# Patient Record
Sex: FEMALE | Race: WHITE | URBAN - METROPOLITAN AREA
[De-identification: names, ages, dates, MRNs, and addresses within clinical notes are randomized per-mention and may not be internally consistent; named-entity substitution may affect disease eponyms.]

---

## 2018-07-12 ENCOUNTER — OFFICE VISIT (OUTPATIENT)
Dept: SURGERY | Age: 43
End: 2018-07-12

## 2018-07-12 VITALS
SYSTOLIC BLOOD PRESSURE: 110 MMHG | HEIGHT: 69 IN | WEIGHT: 125 LBS | DIASTOLIC BLOOD PRESSURE: 60 MMHG | BODY MASS INDEX: 18.51 KG/M2

## 2018-07-12 DIAGNOSIS — K64.5 THROMBOSED HEMORRHOIDS: Primary | ICD-10-CM

## 2018-07-12 PROCEDURE — 99203 OFFICE O/P NEW LOW 30 MIN: CPT | Performed by: SURGERY

## 2018-07-12 RX ORDER — POLYETHYLENE GLYCOL 3350 17 G/17G
17 POWDER, FOR SOLUTION ORAL DAILY
Qty: 510 G | Refills: 0 | Status: SHIPPED | OUTPATIENT
Start: 2018-07-12 | End: 2018-08-11

## 2018-07-12 RX ORDER — HYDROCORTISONE ACETATE 25 MG/1
25 SUPPOSITORY RECTAL EVERY 12 HOURS
Qty: 12 SUPPOSITORY | Refills: 1 | Status: SHIPPED | OUTPATIENT
Start: 2018-07-12

## 2018-07-12 ASSESSMENT — ENCOUNTER SYMPTOMS
ANAL BLEEDING: 1
EYE ITCHING: 0
CONSTIPATION: 0
CHOKING: 0
PHOTOPHOBIA: 0
STRIDOR: 0
SORE THROAT: 0
RECTAL PAIN: 1
BACK PAIN: 0
APNEA: 0
EYE REDNESS: 0
COLOR CHANGE: 0

## 2018-07-12 NOTE — PROGRESS NOTES
Chief Complaint   Patient presents with    New Patient     N/P Hemorrhoids ref Dr Summer Haas:  HPI: Patient presents for evaluation of possible hemorrhoids. Onset of symptoms was 2 days ago. Patient does report perianal mass. Patient describes symptoms as gradually worsening. Patient denies fever/chills. Patient denies drainage from the rectal area. Treatment to date has been none. Patient denies a personal history of colon cancer. Patient denies history of previous STDs  Patient denies a personal history of IBD. I have reviewed the patient's(pertinent information to this visit) medical history, family history(scanned in  the Media tab under \"patient questioner\"), social history and review of systems with the patient today in the office. No past surgical history on file. No past medical history on file. No family history on file. Social History     Social History    Marital status:      Spouse name: N/A    Number of children: N/A    Years of education: N/A     Occupational History    Not on file. Social History Main Topics    Smoking status: Current Every Day Smoker     Packs/day: 1.00     Types: Cigarettes    Smokeless tobacco: Never Used    Alcohol use Yes      Comment: ONCE OR TWICE A WEEK    Drug use: Unknown    Sexual activity: Not on file     Other Topics Concern    Not on file     Social History Narrative    No narrative on file       Current Outpatient Prescriptions   Medication Sig Dispense Refill    polyethylene glycol (MIRALAX) powder Take 17 g by mouth daily 510 g 0    hydrocortisone (ANUSOL-HC) 25 MG suppository Place 1 suppository rectally every 12 hours 12 suppository 1     No current facility-administered medications for this visit. Allergies   Allergen Reactions    Morphine        Review of Systems:         Review of Systems   Constitutional: Negative for chills and fever.    HENT: Negative for ear pain, mouth sores, sore throat and Refill:  0    hydrocortisone (ANUSOL-HC) 25 MG suppository     Sig: Place 1 suppository rectally every 12 hours     Dispense:  12 suppository     Refill:  1        Follow Up:  Return in about 2 weeks (around 7/26/2018).       Silvia Josue MD

## 2018-07-26 ENCOUNTER — TELEPHONE (OUTPATIENT)
Dept: SURGERY | Age: 43
End: 2018-07-26

## 2022-07-09 ENCOUNTER — TELEPHONE ENCOUNTER (OUTPATIENT)
Dept: URBAN - METROPOLITAN AREA CLINIC 121 | Facility: CLINIC | Age: 47
End: 2022-07-09

## 2022-07-10 ENCOUNTER — TELEPHONE ENCOUNTER (OUTPATIENT)
Dept: URBAN - METROPOLITAN AREA CLINIC 121 | Facility: CLINIC | Age: 47
End: 2022-07-10

## 2022-09-26 ENCOUNTER — LAB OUTSIDE AN ENCOUNTER (OUTPATIENT)
Dept: URBAN - METROPOLITAN AREA CLINIC 60 | Facility: CLINIC | Age: 47
End: 2022-09-26

## 2022-09-26 ENCOUNTER — WEB ENCOUNTER (OUTPATIENT)
Dept: URBAN - METROPOLITAN AREA CLINIC 60 | Facility: CLINIC | Age: 47
End: 2022-09-26

## 2022-09-26 ENCOUNTER — OFFICE VISIT (OUTPATIENT)
Dept: URBAN - METROPOLITAN AREA CLINIC 60 | Facility: CLINIC | Age: 47
End: 2022-09-26
Payer: COMMERCIAL

## 2022-09-26 ENCOUNTER — OFFICE VISIT (OUTPATIENT)
Dept: URBAN - METROPOLITAN AREA CLINIC 60 | Facility: CLINIC | Age: 47
End: 2022-09-26

## 2022-09-26 VITALS
HEIGHT: 70 IN | OXYGEN SATURATION: 97 % | SYSTOLIC BLOOD PRESSURE: 112 MMHG | DIASTOLIC BLOOD PRESSURE: 68 MMHG | HEART RATE: 72 BPM | RESPIRATION RATE: 12 BRPM | BODY MASS INDEX: 19.81 KG/M2 | WEIGHT: 138.4 LBS | TEMPERATURE: 97.9 F

## 2022-09-26 DIAGNOSIS — Z12.11 SCREENING FOR COLON CANCER: ICD-10-CM

## 2022-09-26 DIAGNOSIS — R93.89 ABNORMAL FINDING ON IMAGING: ICD-10-CM

## 2022-09-26 PROCEDURE — 99203 OFFICE O/P NEW LOW 30 MIN: CPT | Performed by: INTERNAL MEDICINE

## 2022-09-26 RX ORDER — DICYCLOMINE HYDROCHLORIDE 20 MG/1
TABLET ORAL
Qty: 40 TABLET | Status: ACTIVE | COMMUNITY

## 2022-09-26 RX ORDER — POLYETHYLENE GLYCOL 3350, SODIUM CHLORIDE, SODIUM BICARBONATE, POTASSIUM CHLORIDE 420; 11.2; 5.72; 1.48 G/4L; G/4L; G/4L; G/4L
AS DIRECTED POWDER, FOR SOLUTION ORAL ONCE
Qty: 420 G | Refills: 0 | OUTPATIENT
Start: 2022-09-26 | End: 2022-09-27

## 2022-09-26 RX ORDER — MECLIZINE HYDROCHLORIDE 25 MG/1
TABLET ORAL
Qty: 90 TABLET | Status: ACTIVE | COMMUNITY

## 2022-09-26 RX ORDER — OXYCODONE AND ACETAMINOPHEN 5; 325 MG/1; MG/1
TABLET ORAL
Qty: 8 TABLET | Status: ACTIVE | COMMUNITY

## 2022-09-26 RX ORDER — METHYLPREDNISOLONE 32 MG/1
TABLET ORAL
Qty: 2 TABLET | Status: ACTIVE | COMMUNITY

## 2022-09-26 RX ORDER — VENLAFAXINE HYDROCHLORIDE 37.5 MG/1
CAPSULE, EXTENDED RELEASE ORAL
Qty: 7 CAPSULE | Status: ACTIVE | COMMUNITY

## 2022-09-26 RX ORDER — ALPRAZOLAM 0.5 MG/1
TABLET ORAL
Qty: 21 TABLET | Status: ACTIVE | COMMUNITY

## 2022-09-26 RX ORDER — FLUTICASONE FUROATE, UMECLIDINIUM BROMIDE AND VILANTEROL TRIFENATATE 100; 62.5; 25 UG/1; UG/1; UG/1
POWDER RESPIRATORY (INHALATION)
Qty: 180 UNSPECIFIED | Status: ACTIVE | COMMUNITY

## 2022-09-26 RX ORDER — ONDANSETRON 4 MG/1
TABLET, ORALLY DISINTEGRATING ORAL
Qty: 30 TABLET | Status: ACTIVE | COMMUNITY

## 2022-09-26 RX ORDER — LAMOTRIGINE 25 MG/1
TABLET ORAL
Qty: 210 TABLET | Status: ACTIVE | COMMUNITY

## 2022-09-26 RX ORDER — CARIPRAZINE 1.5 MG/1
CAPSULE, GELATIN COATED ORAL
Qty: 30 CAPSULE | Status: ACTIVE | COMMUNITY

## 2022-09-26 NOTE — PHYSICAL EXAM CONSTITUTIONAL:
Continue ointment tid. well developed, well nourished , in no acute distress , ambulating without difficulty , normal communication ability

## 2022-09-26 NOTE — HPI-TODAY'S VISIT:
Maureen is a 45 y/o female that presents today as a new patient. Referred for screening colonoscopy and abnormal PET scan. No prior colonoscopy. No reported family history of colon cancer. She has had abnormal imaging in the past which has revealed lymph nodes in the mediastinum. She has had thoracic surgery for biopsy and removal of these lymph nodes in the past. Exact etiology is unclear. Possible sarcoidosis based on her pathology and blood work. She had CT abdomen/pelvis 07/2022. CT abdomen/pelvis was unremarkable. She had PET scan which showed some diffuse uptake in the gastric wall, thought to be inflammatory. No prior EGD.  She has occasional nausea. Denies vomiting. She has alternating bouts of diarrhea and constipation. Denies abdominal pain. Denies blood in the stool, melena, hematochezia. Denies weight loss. Denies frequent heartburn. Denies dysphagia.

## 2022-10-18 ENCOUNTER — CLAIMS CREATED FROM THE CLAIM WINDOW (OUTPATIENT)
Dept: URBAN - METROPOLITAN AREA CLINIC 4 | Facility: CLINIC | Age: 47
End: 2022-10-18
Payer: COMMERCIAL

## 2022-10-18 ENCOUNTER — OFFICE VISIT (OUTPATIENT)
Dept: URBAN - METROPOLITAN AREA SURGERY CENTER 4 | Facility: SURGERY CENTER | Age: 47
End: 2022-10-18
Payer: COMMERCIAL

## 2022-10-18 DIAGNOSIS — K64.0 GRADE I HEMORRHOIDS: ICD-10-CM

## 2022-10-18 DIAGNOSIS — R93.3: ICD-10-CM

## 2022-10-18 DIAGNOSIS — Z12.11 SCREENING FOR COLON CANCER: ICD-10-CM

## 2022-10-18 DIAGNOSIS — K22.89 OTHER SPECIFIED DISEASE OF ESOPHAGUS: ICD-10-CM

## 2022-10-18 DIAGNOSIS — K31.89 OTHER DISEASES OF STOMACH AND DUODENUM: ICD-10-CM

## 2022-10-18 PROCEDURE — G0121 COLON CA SCRN NOT HI RSK IND: HCPCS | Performed by: INTERNAL MEDICINE

## 2022-10-18 PROCEDURE — 43239 EGD BIOPSY SINGLE/MULTIPLE: CPT | Performed by: INTERNAL MEDICINE

## 2022-10-18 PROCEDURE — G8918 PT W/O PREOP ORDER IV AB PRO: HCPCS | Performed by: INTERNAL MEDICINE

## 2022-10-18 PROCEDURE — 88312 SPECIAL STAINS GROUP 1: CPT | Performed by: PATHOLOGY

## 2022-10-18 PROCEDURE — G8907 PT DOC NO EVENTS ON DISCHARG: HCPCS | Performed by: INTERNAL MEDICINE

## 2022-10-18 PROCEDURE — 88305 TISSUE EXAM BY PATHOLOGIST: CPT | Performed by: PATHOLOGY

## 2022-10-18 RX ORDER — ALPRAZOLAM 0.5 MG/1
TABLET ORAL
Qty: 21 TABLET | Status: ACTIVE | COMMUNITY

## 2022-10-18 RX ORDER — CARIPRAZINE 1.5 MG/1
CAPSULE, GELATIN COATED ORAL
Qty: 30 CAPSULE | Status: ACTIVE | COMMUNITY

## 2022-10-18 RX ORDER — LAMOTRIGINE 25 MG/1
TABLET ORAL
Qty: 210 TABLET | Status: ACTIVE | COMMUNITY

## 2022-10-18 RX ORDER — ONDANSETRON 4 MG/1
TABLET, ORALLY DISINTEGRATING ORAL
Qty: 30 TABLET | Status: ACTIVE | COMMUNITY

## 2022-10-18 RX ORDER — VENLAFAXINE HYDROCHLORIDE 37.5 MG/1
CAPSULE, EXTENDED RELEASE ORAL
Qty: 7 CAPSULE | Status: ACTIVE | COMMUNITY

## 2022-10-18 RX ORDER — MECLIZINE HYDROCHLORIDE 25 MG/1
TABLET ORAL
Qty: 90 TABLET | Status: ACTIVE | COMMUNITY

## 2022-10-18 RX ORDER — OXYCODONE AND ACETAMINOPHEN 5; 325 MG/1; MG/1
TABLET ORAL
Qty: 8 TABLET | Status: ACTIVE | COMMUNITY

## 2022-10-18 RX ORDER — FLUTICASONE FUROATE, UMECLIDINIUM BROMIDE AND VILANTEROL TRIFENATATE 100; 62.5; 25 UG/1; UG/1; UG/1
POWDER RESPIRATORY (INHALATION)
Qty: 180 UNSPECIFIED | Status: ACTIVE | COMMUNITY

## 2022-10-18 RX ORDER — DICYCLOMINE HYDROCHLORIDE 20 MG/1
TABLET ORAL
Qty: 40 TABLET | Status: ACTIVE | COMMUNITY

## 2022-10-18 RX ORDER — METHYLPREDNISOLONE 32 MG/1
TABLET ORAL
Qty: 2 TABLET | Status: ACTIVE | COMMUNITY

## 2022-11-02 ENCOUNTER — OFFICE VISIT (OUTPATIENT)
Dept: URBAN - METROPOLITAN AREA CLINIC 60 | Facility: CLINIC | Age: 47
End: 2022-11-02

## 2022-11-08 ENCOUNTER — OFFICE VISIT (OUTPATIENT)
Dept: URBAN - METROPOLITAN AREA CLINIC 63 | Facility: CLINIC | Age: 47
End: 2022-11-08

## 2022-11-29 ENCOUNTER — OFFICE VISIT (OUTPATIENT)
Dept: URBAN - METROPOLITAN AREA CLINIC 63 | Facility: CLINIC | Age: 47
End: 2022-11-29
Payer: COMMERCIAL

## 2022-11-29 VITALS
TEMPERATURE: 98.4 F | HEIGHT: 70 IN | HEART RATE: 90 BPM | DIASTOLIC BLOOD PRESSURE: 80 MMHG | WEIGHT: 142 LBS | OXYGEN SATURATION: 98 % | BODY MASS INDEX: 20.33 KG/M2 | SYSTOLIC BLOOD PRESSURE: 118 MMHG

## 2022-11-29 DIAGNOSIS — R93.89 ABNORMAL FINDING ON IMAGING: ICD-10-CM

## 2022-11-29 DIAGNOSIS — Z12.11 SCREENING FOR MALIGNANT NEOPLASM OF COLON: ICD-10-CM

## 2022-11-29 PROBLEM — 408574004: Status: ACTIVE | Noted: 2022-09-26

## 2022-11-29 PROCEDURE — 99213 OFFICE O/P EST LOW 20 MIN: CPT | Performed by: PHYSICIAN ASSISTANT

## 2022-11-29 RX ORDER — FLUTICASONE FUROATE, UMECLIDINIUM BROMIDE AND VILANTEROL TRIFENATATE 100; 62.5; 25 UG/1; UG/1; UG/1
POWDER RESPIRATORY (INHALATION)
Qty: 180 UNSPECIFIED | Status: ACTIVE | COMMUNITY

## 2022-11-29 RX ORDER — ONDANSETRON 4 MG/1
TABLET, ORALLY DISINTEGRATING ORAL
Qty: 30 TABLET | Status: ACTIVE | COMMUNITY

## 2022-11-29 RX ORDER — VENLAFAXINE HYDROCHLORIDE 37.5 MG/1
CAPSULE, EXTENDED RELEASE ORAL
Qty: 7 CAPSULE | Status: ACTIVE | COMMUNITY

## 2022-11-29 NOTE — HPI-TODAY'S VISIT:
46-year-old female was referred to the office in September 2022 for baseline screening colonoscopy and abnormal PET scan.  She has had abnormal imaging in the past which has revealed lymph nodes in the mediastinum.  She had thoracic surgery for biopsy and removal of these lymph nodes in the past.  The etiology is unclear.  Possible sarcoidosis based on her pathology and blood work.  CT abdomen/pelvis done in July 2022 was unremarkable.  She had a PET scan which showed some diffuse uptake in the gastric wall, thought to be inflammatory. She reported occasional nausea at her initial office visit.  She denied any vomiting.  She denied abdominal pain, blood in the stool, melena, hematochezia.  She reported alternating bouts of diarrhea and constipation.  She had no other complaints. She follows up today after EGD and colonoscopy which were done on October 18, 2022.  Her EGD demonstrated an irregular Z-line at the GE junction.  The biopsy showed no significant abnormality.  Mildly erythematous mucosa was found in the gastric antrum.  There were no endoscopic findings to correlate with the abnormal PET scan findings.  Biopsies were taken from the gastric body and antrum and demonstrated no significant abnormality.  The duodenum appeared normal.  Her colonoscopy to the cecum was normal with the exception of grade 1 internal hemorrhoids.  She follows up today doing well. States she has some occasional nausea for which she utilizes zofran which works well. She has no other GI complaints today.   She has no family history of colon cancer.

## 2023-09-08 ENCOUNTER — OFFICE VISIT (OUTPATIENT)
Dept: URBAN - METROPOLITAN AREA CLINIC 60 | Facility: CLINIC | Age: 48
End: 2023-09-08
Payer: COMMERCIAL

## 2023-09-08 ENCOUNTER — LAB OUTSIDE AN ENCOUNTER (OUTPATIENT)
Dept: URBAN - METROPOLITAN AREA CLINIC 60 | Facility: CLINIC | Age: 48
End: 2023-09-08

## 2023-09-08 ENCOUNTER — WEB ENCOUNTER (OUTPATIENT)
Dept: URBAN - METROPOLITAN AREA CLINIC 60 | Facility: CLINIC | Age: 48
End: 2023-09-08

## 2023-09-08 VITALS
HEIGHT: 70 IN | SYSTOLIC BLOOD PRESSURE: 124 MMHG | DIASTOLIC BLOOD PRESSURE: 68 MMHG | TEMPERATURE: 98 F | WEIGHT: 126.8 LBS | BODY MASS INDEX: 18.15 KG/M2 | OXYGEN SATURATION: 98 % | HEART RATE: 68 BPM | RESPIRATION RATE: 12 BRPM

## 2023-09-08 DIAGNOSIS — K58.2 IRRITABLE BOWEL SYNDROME WITH ALTERNATING BOWEL HABITS: ICD-10-CM

## 2023-09-08 DIAGNOSIS — R13.19 ESOPHAGEAL DYSPHAGIA: ICD-10-CM

## 2023-09-08 DIAGNOSIS — Z12.11 SCREENING FOR MALIGNANT NEOPLASM OF COLON: ICD-10-CM

## 2023-09-08 DIAGNOSIS — R10.13 EPIGASTRIC PAIN: ICD-10-CM

## 2023-09-08 PROBLEM — 440544005: Status: ACTIVE | Noted: 2023-09-08

## 2023-09-08 PROCEDURE — 99214 OFFICE O/P EST MOD 30 MIN: CPT | Performed by: PHYSICIAN ASSISTANT

## 2023-09-08 RX ORDER — PENTOXIFYLLINE 400 MG/1
1 TABLET WITH MEALS TABLET, EXTENDED RELEASE ORAL TWICE A DAY
Status: ACTIVE | COMMUNITY

## 2023-09-08 RX ORDER — ACETAZOLAMIDE 250 MG/1
1 TABLET AS NEEDED TABLET ORAL ONCE A DAY
Status: ACTIVE | COMMUNITY

## 2023-09-08 RX ORDER — METOPROLOL TARTRATE 25 MG/1
1 TABLET WITH FOOD TABLET, FILM COATED ORAL TWICE A DAY
Status: ACTIVE | COMMUNITY

## 2023-09-08 RX ORDER — BUDESONIDE 1 MG/2ML
1 ML SUSPENSION RESPIRATORY (INHALATION) TWICE A DAY
Status: ACTIVE | COMMUNITY

## 2023-09-08 RX ORDER — FLUTICASONE FUROATE, UMECLIDINIUM BROMIDE AND VILANTEROL TRIFENATATE 100; 62.5; 25 UG/1; UG/1; UG/1
POWDER RESPIRATORY (INHALATION)
Qty: 180 UNSPECIFIED | Status: ACTIVE | COMMUNITY

## 2023-09-08 RX ORDER — BUDESONIDE 3 MG/1
2 CAPSULES CAPSULE ORAL ONCE A DAY
Status: ACTIVE | COMMUNITY

## 2023-09-08 RX ORDER — ONDANSETRON 4 MG/1
TABLET, ORALLY DISINTEGRATING ORAL
Qty: 30 TABLET | Status: ACTIVE | COMMUNITY

## 2023-09-08 RX ORDER — KETOTIFEN FUMARATE 0.25 MG/ML
1 DROP INTO AFFECTED EYE SOLUTION/ DROPS OPHTHALMIC TWICE A DAY
Status: ACTIVE | COMMUNITY

## 2023-09-08 NOTE — HPI-TODAY'S VISIT:
47-year-old female with POTS and asthma presents to the office for evaluation of dysphagia.  She states she has been having difficulty swallowing her pills and also solid foods. States she chokes frequently and will belch up her pills. She choked on a fish bone as a child and has had dysphagia since then. She reports alternating constipation with diarrhea. States she was having pain in the epigastrium on a daily basis which is severe. She states her abdominal pain feels like stabbing pain. She has not had any significant abdominal pain over the past week. No abdominal pain today.  Bowel habits chronically alternate between diarrhea and constipation. No blood in the stool. Denies regular NSAID use. Previously used aleve for headaches but no longer using this.   She was last seen in the office in 2022.  She was referred for baseline screening colonoscopy and to evaluate PET scan showing diffuse uptake in the gastric wall. EGD and colonoscopy were done on October 18, 2022.  Her EGD demonstrated an irregular Z-line at the GE junction.  The biopsy showed no significant abnormality.  Mildly erythematous mucosa was found in the gastric antrum.  There were no endoscopic findings to correlate with the abnormal PET scan findings.  Biopsies from the gastric body and antrum demonstrated no significant abnormality.  The duodenum appeared normal.  Her colonoscopy to the cecum was normal with the exception of grade 1 internal hemorrhoids.  Repeat colonoscopy was recommended in 10 years. She has no family history of colon cancer.

## 2023-09-09 LAB
A/G RATIO: 2.5
ALBUMIN: 4.3
ALKALINE PHOSPHATASE: 54
ALT (SGPT): 9
AST (SGOT): 12
BILIRUBIN, TOTAL: 0.3
BUN/CREATININE RATIO: 8
BUN: 6
CALCIUM: 8.8
CARBON DIOXIDE, TOTAL: 21
CHLORIDE: 110
CREATININE: 0.72
EGFR: 104
GLOBULIN, TOTAL: 1.7
GLUCOSE: 81
HEMATOCRIT: 39
HEMOGLOBIN: 13
LIPASE: 54
MCH: 31
MCHC: 33.3
MCV: 93
NRBC: (no result)
PLATELETS: 233
POTASSIUM: 3.5
PROTEIN, TOTAL: 6
RBC: 4.19
RDW: 13.1
SODIUM: 143
WBC: 5.9

## 2023-09-26 ENCOUNTER — OFFICE VISIT (OUTPATIENT)
Dept: URBAN - METROPOLITAN AREA SURGERY CENTER 4 | Facility: SURGERY CENTER | Age: 48
End: 2023-09-26
Payer: COMMERCIAL

## 2023-09-26 ENCOUNTER — CLAIMS CREATED FROM THE CLAIM WINDOW (OUTPATIENT)
Dept: URBAN - METROPOLITAN AREA CLINIC 4 | Facility: CLINIC | Age: 48
End: 2023-09-26
Payer: COMMERCIAL

## 2023-09-26 DIAGNOSIS — R13.10 DYSPHAGIA: ICD-10-CM

## 2023-09-26 DIAGNOSIS — K31.89 OTHER DISEASES OF STOMACH AND DUODENUM: ICD-10-CM

## 2023-09-26 DIAGNOSIS — K22.89 OTHER SPECIFIED DISEASE OF ESOPHAGUS: ICD-10-CM

## 2023-09-26 DIAGNOSIS — K22.9 IRREGULAR Z LINE OF ESOPHAGUS: ICD-10-CM

## 2023-09-26 DIAGNOSIS — K29.70 GASTRITIS: ICD-10-CM

## 2023-09-26 DIAGNOSIS — K29.70 GASTRITIS, UNSPECIFIED, WITHOUT BLEEDING: ICD-10-CM

## 2023-09-26 DIAGNOSIS — T47.8X5A ADVERSE EFFECT OF OTHER AGENTS PRIMARILY AFFECTING GASTROINTESTINAL SYSTEM, INITIAL ENCOUNTER: ICD-10-CM

## 2023-09-26 DIAGNOSIS — Z12.11 COLON CANCER SCREENING (HIGH RISK): ICD-10-CM

## 2023-09-26 PROCEDURE — 88342 IMHCHEM/IMCYTCHM 1ST ANTB: CPT | Performed by: PATHOLOGY

## 2023-09-26 PROCEDURE — 43239 EGD BIOPSY SINGLE/MULTIPLE: CPT | Performed by: INTERNAL MEDICINE

## 2023-09-26 PROCEDURE — 88305 TISSUE EXAM BY PATHOLOGIST: CPT | Performed by: PATHOLOGY

## 2023-09-26 PROCEDURE — 88312 SPECIAL STAINS GROUP 1: CPT | Performed by: PATHOLOGY

## 2023-09-26 PROCEDURE — 43450 DILATE ESOPHAGUS 1/MULT PASS: CPT | Performed by: INTERNAL MEDICINE

## 2023-09-26 PROCEDURE — 88313 SPECIAL STAINS GROUP 2: CPT | Performed by: PATHOLOGY

## 2023-09-26 PROCEDURE — 00731 ANES UPR GI NDSC PX NOS: CPT | Performed by: NURSE ANESTHETIST, CERTIFIED REGISTERED

## 2023-09-26 RX ORDER — BUDESONIDE 3 MG/1
2 CAPSULES CAPSULE ORAL ONCE A DAY
Status: ACTIVE | COMMUNITY

## 2023-09-26 RX ORDER — BUDESONIDE 1 MG/2ML
1 ML SUSPENSION RESPIRATORY (INHALATION) TWICE A DAY
Status: ACTIVE | COMMUNITY

## 2023-09-26 RX ORDER — PENTOXIFYLLINE 400 MG/1
1 TABLET WITH MEALS TABLET, EXTENDED RELEASE ORAL TWICE A DAY
Status: ACTIVE | COMMUNITY

## 2023-09-26 RX ORDER — METOPROLOL TARTRATE 25 MG/1
1 TABLET WITH FOOD TABLET, FILM COATED ORAL TWICE A DAY
Status: ACTIVE | COMMUNITY

## 2023-09-26 RX ORDER — ACETAZOLAMIDE 250 MG/1
1 TABLET AS NEEDED TABLET ORAL ONCE A DAY
Status: ACTIVE | COMMUNITY

## 2023-09-26 RX ORDER — ONDANSETRON 4 MG/1
TABLET, ORALLY DISINTEGRATING ORAL
Qty: 30 TABLET | Status: ACTIVE | COMMUNITY

## 2023-09-26 RX ORDER — KETOTIFEN FUMARATE 0.25 MG/ML
1 DROP INTO AFFECTED EYE SOLUTION/ DROPS OPHTHALMIC TWICE A DAY
Status: ACTIVE | COMMUNITY

## 2023-09-26 RX ORDER — FLUTICASONE FUROATE, UMECLIDINIUM BROMIDE AND VILANTEROL TRIFENATATE 100; 62.5; 25 UG/1; UG/1; UG/1
POWDER RESPIRATORY (INHALATION)
Qty: 180 UNSPECIFIED | Status: ACTIVE | COMMUNITY

## 2023-09-27 ENCOUNTER — TELEPHONE ENCOUNTER (OUTPATIENT)
Dept: URBAN - METROPOLITAN AREA CLINIC 60 | Facility: CLINIC | Age: 48
End: 2023-09-27

## 2023-09-28 ENCOUNTER — TELEPHONE ENCOUNTER (OUTPATIENT)
Dept: URBAN - METROPOLITAN AREA CLINIC 63 | Facility: CLINIC | Age: 48
End: 2023-09-28

## 2023-10-18 ENCOUNTER — OFFICE VISIT (OUTPATIENT)
Dept: URBAN - METROPOLITAN AREA CLINIC 60 | Facility: CLINIC | Age: 48
End: 2023-10-18
Payer: COMMERCIAL

## 2023-10-18 VITALS
OXYGEN SATURATION: 98 % | WEIGHT: 122 LBS | BODY MASS INDEX: 17.47 KG/M2 | HEIGHT: 70 IN | DIASTOLIC BLOOD PRESSURE: 78 MMHG | HEART RATE: 78 BPM | TEMPERATURE: 98.2 F | SYSTOLIC BLOOD PRESSURE: 122 MMHG

## 2023-10-18 DIAGNOSIS — K58.2 IRRITABLE BOWEL SYNDROME WITH ALTERNATING BOWEL HABITS: ICD-10-CM

## 2023-10-18 DIAGNOSIS — R10.13 EPIGASTRIC PAIN: ICD-10-CM

## 2023-10-18 DIAGNOSIS — Z12.11 SCREENING FOR MALIGNANT NEOPLASM OF COLON: ICD-10-CM

## 2023-10-18 PROCEDURE — 99214 OFFICE O/P EST MOD 30 MIN: CPT | Performed by: PHYSICIAN ASSISTANT

## 2023-10-18 RX ORDER — BUDESONIDE 1 MG/2ML
1 ML SUSPENSION RESPIRATORY (INHALATION) TWICE A DAY
Status: ACTIVE | COMMUNITY

## 2023-10-18 RX ORDER — ACETAZOLAMIDE 250 MG/1
1 TABLET AS NEEDED TABLET ORAL ONCE A DAY
Status: ACTIVE | COMMUNITY

## 2023-10-18 RX ORDER — ONDANSETRON 4 MG/1
TABLET, ORALLY DISINTEGRATING ORAL
Qty: 30 TABLET | Status: ACTIVE | COMMUNITY

## 2023-10-18 RX ORDER — KETOTIFEN FUMARATE 0.25 MG/ML
1 DROP INTO AFFECTED EYE SOLUTION/ DROPS OPHTHALMIC TWICE A DAY
Status: ACTIVE | COMMUNITY

## 2023-10-18 RX ORDER — FLUTICASONE FUROATE, UMECLIDINIUM BROMIDE AND VILANTEROL TRIFENATATE 100; 62.5; 25 UG/1; UG/1; UG/1
POWDER RESPIRATORY (INHALATION)
Qty: 180 UNSPECIFIED | Status: ACTIVE | COMMUNITY

## 2023-10-18 RX ORDER — PENTOXIFYLLINE 400 MG/1
1 TABLET WITH MEALS TABLET, EXTENDED RELEASE ORAL TWICE A DAY
Status: ACTIVE | COMMUNITY

## 2023-10-18 RX ORDER — METOPROLOL TARTRATE 25 MG/1
1 TABLET WITH FOOD TABLET, FILM COATED ORAL TWICE A DAY
Status: ACTIVE | COMMUNITY

## 2023-10-18 RX ORDER — PANTOPRAZOLE SODIUM 40 MG/1
1 TABLET TABLET, DELAYED RELEASE ORAL ONCE A DAY
Qty: 30 | Refills: 3 | OUTPATIENT
Start: 2023-10-18

## 2023-10-18 RX ORDER — BUDESONIDE 3 MG/1
2 CAPSULES CAPSULE ORAL ONCE A DAY
Status: ACTIVE | COMMUNITY

## 2023-10-18 NOTE — HPI-TODAY'S VISIT:
47-year-old female with POTS and asthma presented to the office last month for evaluation of dysphagia.  She reported having difficulty swallowing her pills and also solid foods.  She reported choking frequently and belching up her pills.  She choked on a fishbone as a child and has had dysphagia ever since then.  She reported alternating constipation with diarrhea and also epigastric pain on a daily basis which is severe.  She reported her epigastric pain is sharp and stabbing.  Her bowel habits have chronically alternated between diarrhea and constipation over several years.  She denied any blood in the use.  She denied any NSAID use.  Barium swallow was done on September 25 which demonstrated a distended stomach with air.  The barium coated pill transited to the stomach without issue.  The esophagus appeared normal.  There was moderate GERD and intraesophageal reflux.  Abdominal ultrasound done on September 15, 2023 demonstrated no acute abnormality.  The gallbladder appeared normal with no stones or signs of cholecystitis.  Labs were done September 8, 2023.  Her CBC, CMP and lipase were unremarkable.  EGD was done September 26, 2023.  Her EGD demonstrated an irregular Z-line at the GE junction.  Biopsy of the GE junction showed pit hyperplasia, distortion and inflammation, probably from the top of the proximal most gastric folds.  No evidence of intestinal metaplasia.  Biopsy of the middle third of the esophagus showed reflux type changes.  The esophagus appeared normal.  Esophageal dilation was performed with a Sharma dilator with no resistance at [address] [place].  There was moderate inflammation in the gastric antrum and in the prepyloric region of the stomach.  Gastric antral biopsy was negative for H. pylori.  Atrophic mucosa was found in the second portion of the duodenum.  The biopsy showed no significant abnormality.  She follows up doing well.  She had no problems following her procedure. She has been doing better since her last office visit. Still having occasional dyspepsia but has been eating better and has been more active. Still having loose stools with no rectal bleeding. She has no new complaints.   She was previously seen in the office in 2022. At that time she was referred for baseline screening colonoscopy and to evaluate a PET scan which showed diffuse uptake in the gastric wall. Her EGD demonstrated mild GERD with no evidence of Dgugan's esophagus. There was mildly erythematous mucosa in the gastric antrum but no endoscopic findings to correlate with the abnormal PET scan findings. Gastric biopsy showed no significant abnormality. The duodenum appeared normal. Her colonoscopy to the cecum was normal with the exception of grade 1 internal hemorrhoids and repeat colonoscopy was recommended in 10 years.  She has no family history of colon cancer.

## 2023-11-27 ENCOUNTER — TELEPHONE ENCOUNTER (OUTPATIENT)
Dept: URBAN - METROPOLITAN AREA CLINIC 64 | Facility: CLINIC | Age: 48
End: 2023-11-27

## 2024-01-12 ENCOUNTER — OFFICE VISIT (OUTPATIENT)
Dept: URBAN - METROPOLITAN AREA CLINIC 60 | Facility: CLINIC | Age: 49
End: 2024-01-12
Payer: COMMERCIAL

## 2024-01-12 ENCOUNTER — ERX REFILL RESPONSE (OUTPATIENT)
Dept: URBAN - METROPOLITAN AREA CLINIC 60 | Facility: CLINIC | Age: 49
End: 2024-01-12

## 2024-01-12 VITALS
RESPIRATION RATE: 12 BRPM | OXYGEN SATURATION: 98 % | WEIGHT: 127 LBS | HEART RATE: 85 BPM | DIASTOLIC BLOOD PRESSURE: 66 MMHG | SYSTOLIC BLOOD PRESSURE: 110 MMHG | TEMPERATURE: 98.5 F | BODY MASS INDEX: 18.18 KG/M2 | HEIGHT: 70 IN

## 2024-01-12 DIAGNOSIS — Z12.11 SCREENING FOR MALIGNANT NEOPLASM OF COLON: ICD-10-CM

## 2024-01-12 DIAGNOSIS — K58.2 IRRITABLE BOWEL SYNDROME WITH ALTERNATING BOWEL HABITS: ICD-10-CM

## 2024-01-12 DIAGNOSIS — R10.13 EPIGASTRIC PAIN: ICD-10-CM

## 2024-01-12 PROCEDURE — 99214 OFFICE O/P EST MOD 30 MIN: CPT | Performed by: PHYSICIAN ASSISTANT

## 2024-01-12 RX ORDER — FAMOTIDINE 40 MG/1
1 TABLET TABLET, FILM COATED ORAL ONCE A DAY
Qty: 30 TABLET | Refills: 11 | OUTPATIENT

## 2024-01-12 RX ORDER — ACETAZOLAMIDE 250 MG/1
1 TABLET AS NEEDED TABLET ORAL ONCE A DAY
Status: ACTIVE | COMMUNITY

## 2024-01-12 RX ORDER — ONDANSETRON 4 MG/1
TABLET, ORALLY DISINTEGRATING ORAL
Qty: 30 TABLET | Status: ACTIVE | COMMUNITY

## 2024-01-12 RX ORDER — PENTOXIFYLLINE 400 MG/1
1 TABLET WITH MEALS TABLET, EXTENDED RELEASE ORAL TWICE A DAY
Status: ACTIVE | COMMUNITY

## 2024-01-12 RX ORDER — METOPROLOL TARTRATE 25 MG/1
1 TABLET WITH FOOD TABLET, FILM COATED ORAL TWICE A DAY
Status: ACTIVE | COMMUNITY

## 2024-01-12 RX ORDER — FLUTICASONE FUROATE, UMECLIDINIUM BROMIDE AND VILANTEROL TRIFENATATE 100; 62.5; 25 UG/1; UG/1; UG/1
POWDER RESPIRATORY (INHALATION)
Qty: 180 UNSPECIFIED | Status: ACTIVE | COMMUNITY

## 2024-01-12 RX ORDER — FAMOTIDINE 40 MG/1
1 TABLET TABLET, FILM COATED ORAL ONCE A DAY
Qty: 30 TABLET | Refills: 11 | OUTPATIENT
Start: 2024-01-12

## 2024-01-12 RX ORDER — BUDESONIDE 1 MG/2ML
1 ML SUSPENSION RESPIRATORY (INHALATION) TWICE A DAY
Status: ACTIVE | COMMUNITY

## 2024-01-12 RX ORDER — KETOTIFEN FUMARATE 0.25 MG/ML
1 DROP INTO AFFECTED EYE SOLUTION/ DROPS OPHTHALMIC TWICE A DAY
Status: ACTIVE | COMMUNITY

## 2024-01-12 RX ORDER — FAMOTIDINE 40 MG/1
TAKE 1 TABLET BY MOUTH EVERY DAY FOR 30 DAYS TABLET, FILM COATED ORAL
Qty: 30 TABLET | Refills: 11 | OUTPATIENT

## 2024-01-12 RX ORDER — BUDESONIDE 3 MG/1
2 CAPSULES CAPSULE ORAL ONCE A DAY
Status: ACTIVE | COMMUNITY

## 2024-01-12 NOTE — HPI-TODAY'S VISIT:
47-year-old female with POTS and asthma presents to the office for follow-up. She presented to the office in September 2023 reporting difficulty swallowing her pills and solid foods.  She was choking frequently and belching up her pills.  She reported choking on a fishbone as a child and has had issues with dysphagia ever since then.  She also reported alternating constipation with diarrhea and sharp epigastric pain which she described as stabbing and was occurring intermittently every day.  She denied any blood in the stool.  She denied any NSAID use.  Barium swallow was done September 25, 2023 which demonstrated a distended stomach with air.  The barium coated pill transited to the stomach without issue.  The esophagus appeared normal.  There was moderate GERD and intraesophageal reflux. Abdominal ultrasound on September 15, 2023 demonstrated no acute abnormality.  The gallbladder appeared normal with no stones or signs of cholecystitis.  Labs done September 8, 2023 including CBC, CMP and lipase were unremarkable.  EGD was done September 26, 2023.  Her EGD demonstrated an irregular Z-line at the GE junction.  Biopsy of the GE junction showed PET hyperplasia, distortion and inflammation, probably from the top of the proximal most gastric folds.  No evidence of intestinal metaplasia.  Biopsy of the middle third of the esophagus showed reflux type changes.  Esophageal dilation was performed with a Sharma dilator with no resistance at [address] [place].  There was moderate inflammation in the gastric antrum and in the prepyloric region of the stomach.  Gastric antral biopsy was negative for H. pylori.  Atrophic mucosa was found in the second portion of the duodenum.  The biopsy showed no significant abnormality.  She was last seen in the office in October 2023.  She was doing well at the time though she was still having occasional dyspepsia.  She reported that she had been eating better and had been more active.  She reported having loose stools.  She denied any rectal bleeding.  She was started on pantoprazole 40 mg once a day.  High-fiber diet and daily fiber therapy was recommended.  She follows up today reporting resolution of her epigastric discomfort with pantoprazole 40mg daily but she ran out one week ago and now her dyspepsia has returned. She is not using NSAIDs. No other GI complaints.   Baseline screening colonoscopy was done October 18, 2022.  Her colonoscopy was normal to the cecum with the exception of grade 1 internal hemorrhoids and repeat colonoscopy was recommended in 10 years. She has no family history of colon cancer.

## 2024-01-16 ENCOUNTER — ERX REFILL RESPONSE (OUTPATIENT)
Dept: URBAN - METROPOLITAN AREA CLINIC 60 | Facility: CLINIC | Age: 49
End: 2024-01-16

## 2024-01-16 RX ORDER — PANTOPRAZOLE SODIUM 40 MG/1
TAKE 1 TABLET BY MOUTH EVERY DAY FOR 30 DAYS TABLET, DELAYED RELEASE ORAL
Qty: 90 TABLET | Refills: 1 | OUTPATIENT

## 2024-01-16 RX ORDER — PANTOPRAZOLE SODIUM 40 MG/1
TAKE 1 TABLET BY MOUTH EVERY DAY FOR 30 DAYS TABLET, DELAYED RELEASE ORAL
Qty: 90 TABLET | Refills: 2 | OUTPATIENT

## 2024-01-17 ENCOUNTER — OFFICE VISIT (OUTPATIENT)
Dept: URBAN - METROPOLITAN AREA CLINIC 60 | Facility: CLINIC | Age: 49
End: 2024-01-17

## 2024-04-12 ENCOUNTER — OV EP (OUTPATIENT)
Dept: URBAN - METROPOLITAN AREA CLINIC 60 | Facility: CLINIC | Age: 49
End: 2024-04-12

## 2024-04-12 RX ORDER — ONDANSETRON 4 MG/1
TABLET, ORALLY DISINTEGRATING ORAL
Qty: 30 TABLET | Status: ACTIVE | COMMUNITY

## 2024-04-12 RX ORDER — PANTOPRAZOLE SODIUM 40 MG/1
TAKE 1 TABLET BY MOUTH EVERY DAY FOR 30 DAYS TABLET, DELAYED RELEASE ORAL
Qty: 90 TABLET | Refills: 1 | Status: ACTIVE | COMMUNITY

## 2024-04-12 RX ORDER — KETOTIFEN FUMARATE 0.25 MG/ML
1 DROP INTO AFFECTED EYE SOLUTION/ DROPS OPHTHALMIC TWICE A DAY
Status: ACTIVE | COMMUNITY

## 2024-04-12 RX ORDER — METOPROLOL TARTRATE 25 MG/1
1 TABLET WITH FOOD TABLET, FILM COATED ORAL TWICE A DAY
Status: ACTIVE | COMMUNITY

## 2024-04-12 RX ORDER — FAMOTIDINE 40 MG/1
TAKE 1 TABLET BY MOUTH EVERY DAY FOR 30 DAYS TABLET, FILM COATED ORAL
Qty: 30 TABLET | Refills: 11 | Status: ACTIVE | COMMUNITY

## 2024-04-12 RX ORDER — BUDESONIDE 3 MG/1
2 CAPSULES CAPSULE ORAL ONCE A DAY
Status: ACTIVE | COMMUNITY

## 2024-04-12 RX ORDER — ACETAZOLAMIDE 250 MG/1
1 TABLET AS NEEDED TABLET ORAL ONCE A DAY
Status: ACTIVE | COMMUNITY

## 2024-04-12 RX ORDER — FLUTICASONE FUROATE, UMECLIDINIUM BROMIDE AND VILANTEROL TRIFENATATE 100; 62.5; 25 UG/1; UG/1; UG/1
POWDER RESPIRATORY (INHALATION)
Qty: 180 UNSPECIFIED | Status: ACTIVE | COMMUNITY

## 2024-04-12 RX ORDER — PENTOXIFYLLINE 400 MG/1
1 TABLET WITH MEALS TABLET, EXTENDED RELEASE ORAL TWICE A DAY
Status: ACTIVE | COMMUNITY

## 2024-04-12 RX ORDER — BUDESONIDE 1 MG/2ML
1 ML SUSPENSION RESPIRATORY (INHALATION) TWICE A DAY
Status: ACTIVE | COMMUNITY

## 2024-05-01 ENCOUNTER — OFFICE VISIT (OUTPATIENT)
Dept: URBAN - METROPOLITAN AREA CLINIC 60 | Facility: CLINIC | Age: 49
End: 2024-05-01
Payer: COMMERCIAL

## 2024-05-01 ENCOUNTER — DASHBOARD ENCOUNTERS (OUTPATIENT)
Age: 49
End: 2024-05-01

## 2024-05-01 VITALS
SYSTOLIC BLOOD PRESSURE: 114 MMHG | BODY MASS INDEX: 17.21 KG/M2 | TEMPERATURE: 98.5 F | OXYGEN SATURATION: 98 % | HEIGHT: 70 IN | RESPIRATION RATE: 12 BRPM | WEIGHT: 120.2 LBS | HEART RATE: 74 BPM | DIASTOLIC BLOOD PRESSURE: 68 MMHG

## 2024-05-01 DIAGNOSIS — Z12.11 SCREENING FOR MALIGNANT NEOPLASM OF COLON: ICD-10-CM

## 2024-05-01 DIAGNOSIS — K58.2 IRRITABLE BOWEL SYNDROME WITH ALTERNATING BOWEL HABITS: ICD-10-CM

## 2024-05-01 DIAGNOSIS — R10.13 EPIGASTRIC PAIN: ICD-10-CM

## 2024-05-01 PROCEDURE — 99214 OFFICE O/P EST MOD 30 MIN: CPT | Performed by: PHYSICIAN ASSISTANT

## 2024-05-01 RX ORDER — KETOTIFEN FUMARATE 0.25 MG/ML
1 DROP INTO AFFECTED EYE SOLUTION/ DROPS OPHTHALMIC TWICE A DAY
Status: ACTIVE | COMMUNITY

## 2024-05-01 RX ORDER — PENTOXIFYLLINE 400 MG/1
1 TABLET WITH MEALS TABLET, EXTENDED RELEASE ORAL TWICE A DAY
Status: ACTIVE | COMMUNITY

## 2024-05-01 RX ORDER — FAMOTIDINE 40 MG/1
TAKE 1 TABLET BY MOUTH EVERY DAY FOR 90 DAYS TABLET, FILM COATED ORAL
Qty: 90 | Refills: 3

## 2024-05-01 RX ORDER — PANTOPRAZOLE SODIUM 40 MG/1
TAKE 1 TABLET BY MOUTH EVERY DAY FOR 30 DAYS TABLET, DELAYED RELEASE ORAL
Qty: 90 TABLET | Refills: 1 | Status: ACTIVE | COMMUNITY

## 2024-05-01 RX ORDER — ONDANSETRON 4 MG/1
TABLET, ORALLY DISINTEGRATING ORAL
Qty: 30 TABLET | Status: ACTIVE | COMMUNITY

## 2024-05-01 RX ORDER — ACETAZOLAMIDE 250 MG/1
1 TABLET AS NEEDED TABLET ORAL ONCE A DAY
Status: ACTIVE | COMMUNITY

## 2024-05-01 RX ORDER — FLUTICASONE FUROATE, UMECLIDINIUM BROMIDE AND VILANTEROL TRIFENATATE 100; 62.5; 25 UG/1; UG/1; UG/1
POWDER RESPIRATORY (INHALATION)
Qty: 180 UNSPECIFIED | Status: ACTIVE | COMMUNITY

## 2024-05-01 RX ORDER — METOPROLOL TARTRATE 25 MG/1
1 TABLET WITH FOOD TABLET, FILM COATED ORAL TWICE A DAY
Status: ACTIVE | COMMUNITY

## 2024-05-01 RX ORDER — FAMOTIDINE 40 MG/1
TAKE 1 TABLET BY MOUTH EVERY DAY FOR 30 DAYS TABLET, FILM COATED ORAL
Qty: 30 TABLET | Refills: 11 | Status: ACTIVE | COMMUNITY

## 2024-05-01 RX ORDER — PANTOPRAZOLE SODIUM 40 MG/1
TAKE 1 TABLET BY MOUTH EVERY DAY FOR 30 DAYS TABLET, DELAYED RELEASE ORAL
OUTPATIENT

## 2024-05-01 RX ORDER — BUDESONIDE 1 MG/2ML
1 ML SUSPENSION RESPIRATORY (INHALATION) TWICE A DAY
Status: ACTIVE | COMMUNITY

## 2024-05-01 RX ORDER — BUDESONIDE 3 MG/1
2 CAPSULES CAPSULE ORAL ONCE A DAY
Status: ACTIVE | COMMUNITY

## 2024-05-01 RX ORDER — FERROUS SULFATE 325(65) MG
TAKE 1 TABLET BY MOUTH EVERY DAY TABLET ORAL
Qty: 90 EACH | Refills: 0 | Status: ACTIVE | COMMUNITY

## 2024-05-10 ENCOUNTER — TELEPHONE ENCOUNTER (OUTPATIENT)
Dept: URBAN - METROPOLITAN AREA CLINIC 60 | Facility: CLINIC | Age: 49
End: 2024-05-10

## 2024-05-10 RX ORDER — PANTOPRAZOLE SODIUM 40 MG/1
TAKE 1 TABLET BY MOUTH EVERY DAY TABLET, DELAYED RELEASE ORAL ONCE A DAY
Qty: 90 | Refills: 1

## 2024-05-13 ENCOUNTER — ERX REFILL RESPONSE (OUTPATIENT)
Dept: URBAN - METROPOLITAN AREA CLINIC 60 | Facility: CLINIC | Age: 49
End: 2024-05-13

## 2024-05-13 RX ORDER — PANTOPRAZOLE SODIUM 40 MG/1
TAKE 1 TABLET BY MOUTH EVERY DAY TABLET, DELAYED RELEASE ORAL ONCE A DAY
Qty: 90 | Refills: 1 | OUTPATIENT

## 2024-08-09 ENCOUNTER — OFFICE VISIT (OUTPATIENT)
Dept: URBAN - METROPOLITAN AREA CLINIC 60 | Facility: CLINIC | Age: 49
End: 2024-08-09

## 2024-09-04 ENCOUNTER — OFFICE VISIT (OUTPATIENT)
Dept: URBAN - METROPOLITAN AREA CLINIC 63 | Facility: CLINIC | Age: 49
End: 2024-09-04
Payer: COMMERCIAL

## 2024-09-04 VITALS
HEART RATE: 95 BPM | HEIGHT: 70 IN | DIASTOLIC BLOOD PRESSURE: 74 MMHG | BODY MASS INDEX: 18.3 KG/M2 | OXYGEN SATURATION: 99 % | TEMPERATURE: 98.6 F | SYSTOLIC BLOOD PRESSURE: 115 MMHG | WEIGHT: 127.8 LBS

## 2024-09-04 DIAGNOSIS — K58.2 IRRITABLE BOWEL SYNDROME WITH ALTERNATING BOWEL HABITS: ICD-10-CM

## 2024-09-04 DIAGNOSIS — R10.13 EPIGASTRIC PAIN: ICD-10-CM

## 2024-09-04 DIAGNOSIS — Z12.11 SCREENING FOR MALIGNANT NEOPLASM OF COLON: ICD-10-CM

## 2024-09-04 PROCEDURE — 99214 OFFICE O/P EST MOD 30 MIN: CPT | Performed by: PHYSICIAN ASSISTANT

## 2024-09-04 RX ORDER — FAMOTIDINE 40 MG/1
TAKE 1 TABLET BY MOUTH EVERY DAY TABLET, FILM COATED ORAL
Qty: 90 TABLET | Refills: 3

## 2024-09-04 RX ORDER — POTASSIUM CITRATE 15 MEQ/1
TABLET, EXTENDED RELEASE ORAL
Qty: 90 TABLET | Status: ACTIVE | COMMUNITY

## 2024-09-04 RX ORDER — ONDANSETRON 4 MG/1
TABLET, ORALLY DISINTEGRATING ORAL
Qty: 30 TABLET | Status: ACTIVE | COMMUNITY

## 2024-09-04 RX ORDER — FLUTICASONE FUROATE, UMECLIDINIUM BROMIDE AND VILANTEROL TRIFENATATE 100; 62.5; 25 UG/1; UG/1; UG/1
POWDER RESPIRATORY (INHALATION)
Qty: 180 UNSPECIFIED | Status: ACTIVE | COMMUNITY

## 2024-09-04 RX ORDER — METOPROLOL TARTRATE 25 MG/1
1 TABLET WITH FOOD TABLET, FILM COATED ORAL TWICE A DAY
Status: ACTIVE | COMMUNITY

## 2024-09-04 RX ORDER — ACETAZOLAMIDE 250 MG/1
1 TABLET AS NEEDED TABLET ORAL ONCE A DAY
Status: ACTIVE | COMMUNITY

## 2024-09-04 RX ORDER — PANTOPRAZOLE SODIUM 40 MG/1
TAKE 1 TABLET BY MOUTH EVERY DAY TABLET, DELAYED RELEASE ORAL ONCE A DAY
Qty: 90 | Refills: 1 | Status: ACTIVE | COMMUNITY

## 2024-09-04 RX ORDER — ALBUTEROL SULFATE 108 UG/1
INHALE 2 PUFFS EVERY 4 HOURS BY INHALATION ROUTE AS NEEDED INHALANT RESPIRATORY (INHALATION)
Qty: 6.7 GRAM | Refills: 0 | Status: ACTIVE | COMMUNITY

## 2024-09-04 RX ORDER — PENTOXIFYLLINE 400 MG/1
1 TABLET WITH MEALS TABLET, EXTENDED RELEASE ORAL TWICE A DAY
Status: ACTIVE | COMMUNITY

## 2024-09-04 RX ORDER — FERROUS SULFATE 325(65) MG
TAKE 1 TABLET BY MOUTH EVERY DAY TABLET ORAL
Qty: 90 EACH | Refills: 0 | Status: ACTIVE | COMMUNITY

## 2024-09-04 RX ORDER — FAMOTIDINE 40 MG/1
TAKE 1 TABLET BY MOUTH EVERY DAY FOR 90 DAYS TABLET, FILM COATED ORAL
Qty: 90 | Refills: 3 | Status: ACTIVE | COMMUNITY

## 2024-09-04 RX ORDER — MONTELUKAST SODIUM 10 MG/1
TAKE 1 TABLET BY MOUTH EVERY DAY TABLET, COATED ORAL
Qty: 90 EACH | Refills: 0 | Status: ACTIVE | COMMUNITY

## 2024-10-04 ENCOUNTER — OFFICE VISIT (OUTPATIENT)
Dept: URBAN - METROPOLITAN AREA CLINIC 60 | Facility: CLINIC | Age: 49
End: 2024-10-04

## 2025-02-21 ENCOUNTER — ERX REFILL RESPONSE (OUTPATIENT)
Dept: URBAN - METROPOLITAN AREA CLINIC 63 | Facility: CLINIC | Age: 50
End: 2025-02-21

## 2025-02-21 RX ORDER — PANTOPRAZOLE SODIUM 40 MG/1
TAKE 1 TABLET BY MOUTH EVERY DAY FOR 90 DAYS TABLET, DELAYED RELEASE ORAL
Qty: 90 TABLET | Refills: 1 | OUTPATIENT

## 2025-02-21 RX ORDER — PANTOPRAZOLE SODIUM 40 MG/1
TAKE 1 TABLET BY MOUTH EVERY DAY TABLET, DELAYED RELEASE ORAL ONCE A DAY
Qty: 90 | Refills: 1 | OUTPATIENT